# Patient Record
Sex: MALE | Race: WHITE | NOT HISPANIC OR LATINO | Employment: FULL TIME | URBAN - METROPOLITAN AREA
[De-identification: names, ages, dates, MRNs, and addresses within clinical notes are randomized per-mention and may not be internally consistent; named-entity substitution may affect disease eponyms.]

---

## 2017-01-10 ENCOUNTER — OFFICE VISIT (OUTPATIENT)
Dept: URGENT CARE | Facility: CLINIC | Age: 67
End: 2017-01-10
Payer: COMMERCIAL

## 2017-01-10 VITALS
OXYGEN SATURATION: 96 % | TEMPERATURE: 98 F | HEIGHT: 74 IN | WEIGHT: 253.75 LBS | BODY MASS INDEX: 32.57 KG/M2 | HEART RATE: 90 BPM | DIASTOLIC BLOOD PRESSURE: 60 MMHG | SYSTOLIC BLOOD PRESSURE: 118 MMHG

## 2017-01-10 DIAGNOSIS — J02.9 PHARYNGITIS, UNSPECIFIED ETIOLOGY: ICD-10-CM

## 2017-01-10 DIAGNOSIS — J06.9 VIRAL URI WITH COUGH: Primary | ICD-10-CM

## 2017-01-10 LAB
CTP QC/QA: YES
S PYO RRNA THROAT QL PROBE: NEGATIVE

## 2017-01-10 PROCEDURE — 1159F MED LIST DOCD IN RCRD: CPT | Mod: S$GLB,,, | Performed by: NURSE PRACTITIONER

## 2017-01-10 PROCEDURE — 87880 STREP A ASSAY W/OPTIC: CPT | Mod: QW,S$GLB,, | Performed by: NURSE PRACTITIONER

## 2017-01-10 PROCEDURE — 99202 OFFICE O/P NEW SF 15 MIN: CPT | Mod: 25,S$GLB,, | Performed by: NURSE PRACTITIONER

## 2017-01-10 PROCEDURE — 96372 THER/PROPH/DIAG INJ SC/IM: CPT | Mod: S$GLB,,, | Performed by: NURSE PRACTITIONER

## 2017-01-10 PROCEDURE — 87081 CULTURE SCREEN ONLY: CPT

## 2017-01-10 PROCEDURE — 99999 PR PBB SHADOW E&M-EST. PATIENT-LVL IV: CPT | Mod: PBBFAC,,, | Performed by: NURSE PRACTITIONER

## 2017-01-10 RX ORDER — BENZONATATE 100 MG/1
100 CAPSULE ORAL 3 TIMES DAILY PRN
Qty: 30 CAPSULE | Refills: 0 | Status: SHIPPED | OUTPATIENT
Start: 2017-01-10 | End: 2017-01-20

## 2017-01-10 RX ORDER — CETIRIZINE HYDROCHLORIDE 10 MG/1
10 TABLET ORAL
COMMUNITY

## 2017-01-10 RX ORDER — PROMETHAZINE HYDROCHLORIDE AND DEXTROMETHORPHAN HYDROBROMIDE 6.25; 15 MG/5ML; MG/5ML
5 SYRUP ORAL NIGHTLY PRN
Qty: 120 ML | Refills: 0 | Status: SHIPPED | OUTPATIENT
Start: 2017-01-10

## 2017-01-10 RX ORDER — FLUTICASONE PROPIONATE 50 MCG
2 SPRAY, SUSPENSION (ML) NASAL DAILY
Qty: 1 BOTTLE | Refills: 0 | Status: SHIPPED | OUTPATIENT
Start: 2017-01-10

## 2017-01-10 RX ORDER — BETAMETHASONE SODIUM PHOSPHATE AND BETAMETHASONE ACETATE 3; 3 MG/ML; MG/ML
9 INJECTION, SUSPENSION INTRA-ARTICULAR; INTRALESIONAL; INTRAMUSCULAR; SOFT TISSUE
Status: COMPLETED | OUTPATIENT
Start: 2017-01-10 | End: 2017-01-10

## 2017-01-10 RX ADMIN — BETAMETHASONE SODIUM PHOSPHATE AND BETAMETHASONE ACETATE 9 MG: 3; 3 INJECTION, SUSPENSION INTRA-ARTICULAR; INTRALESIONAL; INTRAMUSCULAR; SOFT TISSUE at 01:01

## 2017-01-10 NOTE — MR AVS SNAPSHOT
[facility]  [address] [facility] [place] [address]  Phone: [phone]  Fax: [phone]                  Piotr Pleitez   1/10/2017 12:00 PM   Office Visit    Description:  Male : 1950   Provider:  ANA Weller   Department:  [facility]           Reason for Visit     Cough           Diagnoses this Visit        Comments    Viral URI with cough    -  Primary     Pharyngitis, unspecified etiology                To Do List           Goals (5 Years of Data)     None      Follow-Up and Disposition     Return if symptoms worsen or fail to improve.       These Medications        Disp Refills Start End    fluticasone (FLONASE) 50 mcg/actuation nasal spray 1 Bottle 0 1/10/2017     2 sprays by Each Nare route once daily. - Each Nare    Pharmacy: [facility]/pharmacy #[address] [place] - [address] [facility] Ph #: [phone]       benzonatate (TESSALON) 100 MG capsule 30 capsule 0 1/10/2017 2017    Take 1 capsule (100 mg total) by mouth 3 (three) times daily as needed for Cough. - Oral    Pharmacy: [facility]/pharmacy #[address] [place], [place] [address] - [address] [facility] #: [phone]       promethazine-dextromethorphan (PROMETHAZINE-DM) 6.25-15 mg/5 mL Syrp 120 mL 0 1/10/2017     Take 5 mLs by mouth nightly as needed (Cough). - Oral    Pharmacy: [facility]/pharmacy #[address] [place], [place] [address] - [address] [facility] #: [phone]         [facility]     [facility] Care Line -  Assistance  Registered nurses in the [facility] On Call Center provide clinical advisement, health education, appointment booking, and other advisory services.  Call for this free service at [phone].             Medications           Message regarding Medications     Verify the changes and/or additions to your medication regime listed below are the same as discussed with your clinician today.  If any of these changes or additions are incorrect, please notify your  healthcare provider.        START taking these NEW medications        Refills    fluticasone (FLONASE) 50 mcg/actuation nasal spray 0    Si sprays by Each Nare route once daily.    Class: Normal    Route: Each Nare    benzonatate (TESSALON) 100 MG capsule 0    Sig: Take 1 capsule (100 mg total) by mouth 3 (three) times daily as needed for Cough.    Class: Normal    Route: Oral    promethazine-dextromethorphan (PROMETHAZINE-DM) 6.25-15 mg/5 mL Syrp 0    Sig: Take 5 mLs by mouth nightly as needed (Cough).    Class: Normal    Route: Oral      These medications were administered today        Dose Freq    betamethasone acetate-betamethasone sodium phosphate injection 9 mg 9 mg Clinic/HOD 1 time    Sig: Inject 1.5 mLs (9 mg total) into the muscle one time.    Class: Normal    Route: Intramuscular      STOP taking these medications     DICYCLOMINE HCL (GI COCKTAIL SIMPLE RECORD) Take 50 mLs by mouth daily as needed.    ESOMEPRAZOLE MAGNESIUM (NEXIUM ORAL) Take by mouth.    hydrocodone-acetaminophen 7.5-325mg (NORCO) 7.5-325 mg per tablet Take 1 tablet by mouth every 6 (six) hours as needed for Pain.    MELOXICAM ORAL Take 15 mg by mouth as needed.     ondansetron (ZOFRAN-ODT) 4 MG TbDL Take 1 tablet (4 mg total) by mouth every 8 (eight) hours as needed.    sucralfate (CARAFATE) 1 gram tablet Take 1 tablet (1 g total) by mouth 4 (four) times daily before meals and nightly.           Verify that the below list of medications is an accurate representation of the medications you are currently taking.  If none reported, the list may be blank. If incorrect, please contact your healthcare provider. Carry this list with you in case of emergency.           Current Medications     aspirin (ECOTRIN) 81 MG EC tablet Take 81 mg by mouth once daily.    ATORVASTATIN CALCIUM (LIPITOR ORAL) Take 40 mg by mouth once daily.     METOPROLOL SUCCINATE (TOPROL XL ORAL) Take 25 mg by mouth once daily.     NIACIN (NIASPAN EXTENDED-RELEASE  "ORAL) Take 1,000 mg by mouth once daily.     pantoprazole (PROTONIX) 20 MG tablet Take 2 tablets (40 mg total) by mouth once daily.    benzonatate (TESSALON) 100 MG capsule Take 1 capsule (100 mg total) by mouth 3 (three) times daily as needed for Cough.    cetirizine (ZYRTEC) 10 MG tablet Take 10 mg by mouth.    fluticasone (FLONASE) 50 mcg/actuation nasal spray 2 sprays by Each Nare route once daily.    omeprazole (PRILOSEC) 20 MG capsule Take 1 capsule (20 mg total) by mouth once daily.    promethazine-dextromethorphan (PROMETHAZINE-DM) 6.25-15 mg/5 mL Syrp Take 5 mLs by mouth nightly as needed (Cough).           Clinical Reference Information           Vital Signs - Last Recorded  Most recent update: 1/10/2017 12:17 PM by Sandra Katz LPN    BP Pulse Temp Ht Wt SpO2    118/60 (BP Location: Left arm, Patient Position: Sitting, BP Method: Manual) 90 98.4 °F (36.9 °C) (Oral) 6' 1.5" (1.867 m) 115.1 kg (253 lb 12 oz) 96%    BMI                33.02 kg/m2          Blood Pressure          Most Recent Value    BP  118/60      Allergies as of 1/10/2017     Pcn [Penicillins]      Immunizations Administered on Date of Encounter - 1/10/2017     None      Orders Placed During Today's Visit      Normal Orders This Visit    POCT rapid strep A     Strep A culture, throat          1/10/2017 12:48 PM - Sandra Katz LPN      Component Results     Component Value Flag Ref Range Units Status    Rapid Strep A Screen Negative  Negative  Final     Acceptable Yes    Final            [facility] Sign-Up     Activating your [facility] account is as easy as 1-2-3!     1) Visit [email or web address], select Sign Up Now, enter this activation code and your date of birth, then select Next.  [email or web address]  Expires: 2/24/2017 12:54 PM      2) Create a username and password to use when you visit [facility] in the future and select a security question in case you lose your password and select Next.    3) " Enter your e-mail address and click Sign Up!    Additional Information  If you have questions, please e-mail [email or web address] or call [phone] to talk to our [facility] staff. Remember, [facility] is NOT to be used for urgent needs. For medical emergencies, dial 911.         Instructions      Adult Self-Care for Colds  Colds are caused by viruses. They cant be cured with antibiotics. However, you can relieve symptoms and support your bodys efforts to heal itself. No matter which symptoms you have, be sure to drink plenty of fluids (water or clear soup); stop smoking and drinking alcohol; and get plenty of rest.   Understand a fever  · Take your temperature several times a day. If your fever is 100.4°F (38.0°C) for more than a day, call your doctor.  · Relax, lie down. Go to bed if you want. Just get off your feet and rest. Also, drink plenty of fluids to avoid dehydration.  · Take acetaminophen or a nonsteroidal anti-inflammatory agent (NSAID), such as ibuprofen.  Treat a troubled nose kindly  · Breathe steam or heated humidified air to open blocked nasal passages.  a hot shower or use a vaporizer. Be careful not to get burned by the steam.  · Saline nasal sprays and decongestant tablets help open a stuffy nose. Antihistamines can also help, but they can cause side effects such as drowsiness and drying of the eyes, nose, and mouth.  Soothe a sore throat and cough  · Gargle every 2 hours with 1/4 teaspoon of salt dissolved in 1/2 cup of warm water. Suck on throat lozenges and cough drops to moisten your throat.  · Cough medicines are available but it is unclear how effective they actually are.  · Take acetaminophen or an NSAID, such as ibuprofen to ease throat pain  Ease digestive problems  · Put fluid back into your body. Take frequent sips of clear liquids such as water or broth. Do not drink beverages with a lot of sugar in them, such as juices and sodas. These can make diarrhea worse. Older  children and adults can drink sports drinks.  · As your appetite returns, you can resume your normal diet. Ask your doctor whether there are any foods you should avoid.     When to seek medical care  When you first notice symptoms, ask your health care provider if antiviral medications are appropriate. Antibiotics should not be taken for colds or flu. Also, call your doctor if you have any of the following symptoms or if you arent feeling better after 7 days:  · Shortness of breath  · Pain or pressure in the chest or abdomen  · Worsening symptoms, especially after a period of improvement  · Fever of 100.4°F  (38.0°C) or higher, or fever that doesnt go down with medication  · Sudden dizziness or confusion  · Severe or continued vomiting  · Signs of dehydration, including extreme thirst, dark urine, infrequent urination, dry mouth  · Spotted, red, or very sore throat      © 9402-3973 [employer]. [address] [place] [address], [place] [address]. All rights reserved. This information is not intended as a substitute for professional medical care. Always follow your healthcare professional's instructions.

## 2017-01-10 NOTE — PROGRESS NOTES
Subjective:       Patient ID: Piotr Pleitez is a 66 y.o. male.    Chief Complaint: Cough    Cough   This is a new problem. Episode onset: 3 days. The problem has been gradually worsening. The problem occurs constantly. The cough is non-productive. Associated symptoms include headaches, nasal congestion, postnasal drip and a sore throat. Pertinent negatives include no chest pain, chills, ear congestion, ear pain, eye redness, fever, heartburn, hemoptysis, myalgias, rash, rhinorrhea, shortness of breath, sweats, weight loss or wheezing. Nothing aggravates the symptoms. Treatments tried: dayquil, zyrtec. The treatment provided no relief. His past medical history is significant for environmental allergies. There is no history of asthma, bronchitis or COPD.     Review of Systems   Constitutional: Negative for activity change, appetite change, chills, diaphoresis, fatigue, fever, unexpected weight change and weight loss.   HENT: Positive for congestion, postnasal drip, sneezing and sore throat. Negative for dental problem, drooling, ear discharge, ear pain, facial swelling, hearing loss, mouth sores, nosebleeds, rhinorrhea, sinus pressure, tinnitus, trouble swallowing and voice change.    Eyes: Negative for photophobia, pain, discharge, redness, itching and visual disturbance.   Respiratory: Positive for cough. Negative for apnea, hemoptysis, choking, chest tightness, shortness of breath and wheezing.    Cardiovascular: Negative for chest pain, palpitations and leg swelling.   Gastrointestinal: Negative for abdominal distention, abdominal pain, anal bleeding, blood in stool, constipation, diarrhea, heartburn, nausea, rectal pain and vomiting.   Endocrine: Negative.    Genitourinary: Negative for decreased urine volume, difficulty urinating, dysuria, hematuria and urgency.   Musculoskeletal: Negative for arthralgias, gait problem, joint swelling, myalgias, neck pain and neck stiffness.   Skin: Negative for color change,  pallor, rash and wound.   Allergic/Immunologic: Positive for environmental allergies. Negative for food allergies and immunocompromised state.   Neurological: Positive for headaches. Negative for dizziness, tremors, seizures, syncope, facial asymmetry, speech difficulty, weakness, light-headedness and numbness.   Hematological: Negative for adenopathy. Does not bruise/bleed easily.   Psychiatric/Behavioral: Negative for agitation, behavioral problems, confusion, decreased concentration, dysphoric mood, hallucinations and sleep disturbance. The patient is not nervous/anxious and is not hyperactive.        Objective:      Physical Exam   Constitutional: He is oriented to person, place, and time. He appears well-developed and well-nourished. No distress.   HENT:   Head: Normocephalic and atraumatic.   Right Ear: Tympanic membrane, external ear and ear canal normal. No decreased hearing is noted.   Left Ear: Tympanic membrane, external ear and ear canal normal. No decreased hearing is noted.   Nose: Mucosal edema and rhinorrhea present. No sinus tenderness. No epistaxis. Right sinus exhibits no maxillary sinus tenderness and no frontal sinus tenderness. Left sinus exhibits no maxillary sinus tenderness and no frontal sinus tenderness.   Mouth/Throat: Uvula is midline and mucous membranes are normal. Posterior oropharyngeal erythema present. No oropharyngeal exudate, posterior oropharyngeal edema or tonsillar abscesses.   Eyes: Conjunctivae are normal. Right eye exhibits no discharge. Left eye exhibits no discharge.   Neck: Normal range of motion.   Cardiovascular: Normal rate, regular rhythm and normal heart sounds.    No murmur heard.  Pulmonary/Chest: Effort normal. No accessory muscle usage. No respiratory distress. He has no decreased breath sounds. He has no wheezes. He has no rhonchi. He has no rales. He exhibits no tenderness.   Abdominal: Soft. There is no tenderness.   Musculoskeletal: Normal range of motion.  He exhibits no edema.   Neurological: He is alert and oriented to person, place, and time.   Skin: Skin is warm and dry. He is not diaphoretic. No erythema.   Psychiatric: He has a normal mood and affect. His behavior is normal.   Nursing note and vitals reviewed.      Assessment:       1. Viral URI with cough    2. Pharyngitis, unspecified etiology        Plan:       Piotr was seen today for cough.    Diagnoses and all orders for this visit:    Viral URI with cough  -     fluticasone (FLONASE) 50 mcg/actuation nasal spray; 2 sprays by Each Nare route once daily.  -     betamethasone acetate-betamethasone sodium phosphate injection 9 mg; Inject 1.5 mLs (9 mg total) into the muscle one time.  Rest  Drink plenty of clear fluids  Nasal saline spray to clear nasal drainage and help with nasal congestion  Zyrtec or Claritin to help dry mucus and post nasal drip  Mucinex or Mucinex DM for cough and chest congestion  Tylenol or Ibuprofen for fever, headache and body aches  Warm salt water gargles for throat comfort  Chloraseptic spray or lozenges for throat comfort  See Primary Care Physician or go to ER if symptoms worsen of fail to improve with treatment.      Pharyngitis, unspecified etiology  -     POCT rapid strep A- Negative   -     benzonatate (TESSALON) 100 MG capsule; Take 1 capsule (100 mg total) by mouth 3 (three) times daily as needed for Cough.  -     promethazine-dextromethorphan (PROMETHAZINE-DM) 6.25-15 mg/5 mL Syrp; Take 5 mLs by mouth nightly as needed (Cough).  -     Strep A culture, throat    Patient requests steroid injection. He states that his ENT gives these to him and they work well for him. Last injection over 6 months ago per patient. Risk and benefits of steroid therapy were reviewed in detail and include, but not limited to, elevated blood sugars, avascular necrosis of the joint, necrosis of tissue or infection of the injection site, hallucinations, insomnia, sweating, hyperactivity, tremors,  suppression of one's own cortisone production, delayed wound healing and GI bleeding. The patient would like to proceed with steroid treatment (injection/medication) knowing and verbally accepting the risks.

## 2017-01-10 NOTE — PATIENT INSTRUCTIONS
Adult Self-Care for Colds  Colds are caused by viruses. They cant be cured with antibiotics. However, you can relieve symptoms and support your bodys efforts to heal itself. No matter which symptoms you have, be sure to drink plenty of fluids (water or clear soup); stop smoking and drinking alcohol; and get plenty of rest.   Understand a fever  · Take your temperature several times a day. If your fever is 100.4°F (38.0°C) for more than a day, call your doctor.  · Relax, lie down. Go to bed if you want. Just get off your feet and rest. Also, drink plenty of fluids to avoid dehydration.  · Take acetaminophen or a nonsteroidal anti-inflammatory agent (NSAID), such as ibuprofen.  Treat a troubled nose kindly  · Breathe steam or heated humidified air to open blocked nasal passages.  a hot shower or use a vaporizer. Be careful not to get burned by the steam.  · Saline nasal sprays and decongestant tablets help open a stuffy nose. Antihistamines can also help, but they can cause side effects such as drowsiness and drying of the eyes, nose, and mouth.  Soothe a sore throat and cough  · Gargle every 2 hours with 1/4 teaspoon of salt dissolved in 1/2 cup of warm water. Suck on throat lozenges and cough drops to moisten your throat.  · Cough medicines are available but it is unclear how effective they actually are.  · Take acetaminophen or an NSAID, such as ibuprofen to ease throat pain  Ease digestive problems  · Put fluid back into your body. Take frequent sips of clear liquids such as water or broth. Do not drink beverages with a lot of sugar in them, such as juices and sodas. These can make diarrhea worse. Older children and adults can drink sports drinks.  · As your appetite returns, you can resume your normal diet. Ask your doctor whether there are any foods you should avoid.     When to seek medical care  When you first notice symptoms, ask your health care provider if antiviral medications are  appropriate. Antibiotics should not be taken for colds or flu. Also, call your doctor if you have any of the following symptoms or if you arent feeling better after 7 days:  · Shortness of breath  · Pain or pressure in the chest or abdomen  · Worsening symptoms, especially after a period of improvement  · Fever of 100.4°F  (38.0°C) or higher, or fever that doesnt go down with medication  · Sudden dizziness or confusion  · Severe or continued vomiting  · Signs of dehydration, including extreme thirst, dark urine, infrequent urination, dry mouth  · Spotted, red, or very sore throat      © 7212-2859 [employer]. [address] [place] [address], [place] [address]. All rights reserved. This information is not intended as a substitute for professional medical care. Always follow your healthcare professional's instructions.

## 2017-01-14 LAB — BACTERIA THROAT CULT: NORMAL
